# Patient Record
Sex: MALE | Race: WHITE | NOT HISPANIC OR LATINO | URBAN - METROPOLITAN AREA
[De-identification: names, ages, dates, MRNs, and addresses within clinical notes are randomized per-mention and may not be internally consistent; named-entity substitution may affect disease eponyms.]

---

## 2020-07-25 ENCOUNTER — TELEPHONE ENCOUNTER (OUTPATIENT)
Dept: URBAN - METROPOLITAN AREA CLINIC 13 | Facility: CLINIC | Age: 85
End: 2020-07-25

## 2020-07-25 RX ORDER — EZETIMIBE 10 MG/1
TABLET ORAL
Refills: 0 | OUTPATIENT
Start: 2010-06-28 | End: 2010-07-19

## 2020-07-25 RX ORDER — MULTIVIT-MIN/FA/LYCOPEN/LUTEIN .4-300-25
TAKE 1 TABLET DAILY TABLET ORAL
Refills: 0 | OUTPATIENT
End: 2019-01-18

## 2020-07-25 RX ORDER — METFORMIN HYDROCHLORIDE 500 MG/1
TAKE 1 TABLET DAILY AS DIRECTED TABLET, COATED ORAL
Refills: 0 | OUTPATIENT
End: 2018-02-02

## 2020-07-25 RX ORDER — BIFIDOBACTERIUM LONGUM 10MM CELL
TAKE 1 CAPSULE DAILY CAPSULE ORAL
Refills: 0 | OUTPATIENT
End: 2019-01-18

## 2020-07-25 RX ORDER — ALBUTEROL SULFATE 90 UG/1
INHALE 1 TO 2 PUFFS EVERY 4 TO 6 HOURS AS NEEDED AEROSOL, METERED RESPIRATORY (INHALATION)
Refills: 0 | OUTPATIENT
Start: 2019-01-18 | End: 2019-11-14

## 2020-07-25 RX ORDER — ATORVASTATIN CALCIUM 80 MG
TABLET ORAL
Refills: 0 | OUTPATIENT
Start: 2010-06-28 | End: 2010-07-19

## 2020-07-25 RX ORDER — METFORMIN HYDROCHLORIDE 750 MG/1
TAKE 2 TABLET DAILY TABLET, EXTENDED RELEASE ORAL
Refills: 0 | OUTPATIENT
End: 2019-11-14

## 2020-07-25 RX ORDER — AMLODIPINE BESYLATE 2.5 MG/1
TAKE 1 TABLET DAILY AS DIRECTED TABLET ORAL
Refills: 0 | OUTPATIENT
End: 2019-11-14

## 2020-07-25 RX ORDER — VALSARTAN 160 MG/1
TAKE 1 TABLET DAILY TABLET ORAL
Refills: 0 | OUTPATIENT
End: 2019-11-14

## 2020-07-25 RX ORDER — DIPHENOXYLATE HYDROCHLORIDE AND ATROPINE SULFATE 2.5; .025 MG/1; MG/1
TAKE 1 TABLET DAILY TABLET ORAL
Qty: 90 | Refills: 1 | OUTPATIENT
Start: 2016-01-21 | End: 2019-04-10

## 2020-07-25 RX ORDER — EMPAGLIFLOZIN 10 MG/1
TAKE 1 TABLET BY MOUTH ONCE DAILY TABLET, FILM COATED ORAL
Refills: 0 | OUTPATIENT
End: 2019-11-14

## 2020-07-25 RX ORDER — WARFARIN 5 MG/1
TAKE 1 TABLET DAILY AS DIRECTED TABLET ORAL
Refills: 0 | OUTPATIENT
End: 2019-11-14

## 2020-07-25 RX ORDER — FLUTICASONE PROPIONATE 50 UG/1
USE 1 SPRAY IN EACH NOSTRIL ONCE DAILY SPRAY, METERED NASAL
Refills: 0 | OUTPATIENT
Start: 2019-01-14 | End: 2019-11-14

## 2020-07-26 ENCOUNTER — TELEPHONE ENCOUNTER (OUTPATIENT)
Dept: URBAN - METROPOLITAN AREA CLINIC 13 | Facility: CLINIC | Age: 85
End: 2020-07-26

## 2020-07-26 RX ORDER — METOPROLOL SUCCINATE 50 MG/1
TAKE 1 TABLET DAILY TABLET, EXTENDED RELEASE ORAL
Refills: 0 | Status: ACTIVE | COMMUNITY

## 2020-07-26 RX ORDER — SITAGLIPTIN PHOSPHATE 100 MG
TAKE 1 TABLET DAILY TABLET ORAL
Refills: 0 | Status: ACTIVE | COMMUNITY

## 2020-07-26 RX ORDER — LOSARTAN POTASSIUM 100 MG/1
TABLET, FILM COATED ORAL
Qty: 90 | Refills: 0 | Status: ACTIVE | COMMUNITY
Start: 2018-07-25

## 2020-07-26 RX ORDER — INDAPAMIDE 1.25 MG/1
TABLET, FILM COATED ORAL
Qty: 90 | Refills: 0 | Status: ACTIVE | COMMUNITY
Start: 2018-03-08

## 2020-07-26 RX ORDER — IPRATROPIUM BROMIDE 42 UG/1
SPRAY, METERED NASAL
Qty: 15 | Refills: 0 | Status: ACTIVE | COMMUNITY
Start: 2018-08-20

## 2020-07-26 RX ORDER — METFORMIN HYDROCHLORIDE 750 MG/1
TAKE 2 TABLETS ONCE DAILY WITH THE EVENING MEAL TABLET, EXTENDED RELEASE ORAL
Refills: 0 | Status: ACTIVE | COMMUNITY

## 2020-07-26 RX ORDER — EMPAGLIFLOZIN 25 MG/1
TABLET, FILM COATED ORAL
Refills: 0 | Status: ACTIVE | COMMUNITY

## 2020-07-26 RX ORDER — CLINDAMYCIN PHOSPHATE 10 MG/ML
LOTION TOPICAL
Qty: 60 | Refills: 0 | Status: ACTIVE | COMMUNITY
Start: 2019-02-14

## 2020-07-26 RX ORDER — CHOLECALCIFEROL (VITAMIN D3) 1250 MCG
TAKE CAPSULE  2000-5000 UNITS CAPSULE ORAL
Refills: 0 | Status: ACTIVE | COMMUNITY

## 2020-07-26 RX ORDER — FLUTICASONE PROPIONATE 50 UG/1
SPRAY, METERED NASAL
Qty: 16 | Refills: 0 | Status: ACTIVE | COMMUNITY
Start: 2019-01-14

## 2020-07-26 RX ORDER — DIPHENOXYLATE HCL/ATROPINE 2.5-.025MG
TAKE 1 TABLET TWICE DAILY AS NEEDED TABLET ORAL
Qty: 180 | Refills: 1 | Status: ACTIVE | COMMUNITY

## 2020-07-26 RX ORDER — VALACYCLOVIR 1 G/1
TABLET, FILM COATED ORAL
Qty: 7 | Refills: 0 | Status: ACTIVE | COMMUNITY
Start: 2018-01-23

## 2020-07-26 RX ORDER — WARFARIN 5 MG/1
TAKE 1 TABLET DAILY AS DIRECTED TABLET ORAL
Refills: 0 | Status: ACTIVE | COMMUNITY

## 2020-07-26 RX ORDER — HYDROCODONE BITARTRATE AND ACETAMINOPHEN 5; 325 MG/1; MG/1
TABLET ORAL
Qty: 21 | Refills: 0 | Status: ACTIVE | COMMUNITY
Start: 2019-06-03

## 2020-07-26 RX ORDER — VALSARTAN 160 MG/1
TAKE 1 TABLET DAILY TABLET ORAL
Refills: 0 | Status: ACTIVE | COMMUNITY

## 2020-07-26 RX ORDER — VIT A/VIT C/VIT E/ZINC/COPPER 2148-113
TAKE 1 TABLET DAILY TABLET ORAL
Refills: 0 | Status: ACTIVE | COMMUNITY

## 2020-07-26 RX ORDER — AMLODIPINE BESYLATE 2.5 MG/1
TABLET ORAL
Qty: 90 | Refills: 0 | Status: ACTIVE | COMMUNITY
Start: 2019-08-20

## 2020-07-26 RX ORDER — EZETIMIBE 10 MG/1
TAKE 1 TABLET DAILY TABLET ORAL
Refills: 0 | Status: ACTIVE | COMMUNITY

## 2020-07-26 RX ORDER — ALBUTEROL SULFATE 90 UG/1
AEROSOL, METERED RESPIRATORY (INHALATION)
Qty: 18 | Refills: 0 | Status: ACTIVE | COMMUNITY
Start: 2019-01-18

## 2020-07-26 RX ORDER — PENICILLIN V POTASSIUM 500 MG/1
TABLET, FILM COATED ORAL
Qty: 28 | Refills: 0 | Status: ACTIVE | COMMUNITY
Start: 2019-06-03

## 2020-07-26 RX ORDER — INDAPAMIDE 1.25 MG/1
TABLET, FILM COATED ORAL
Qty: 90 | Refills: 0 | Status: ACTIVE | COMMUNITY
Start: 2018-05-31

## 2020-10-15 ENCOUNTER — OFFICE VISIT (OUTPATIENT)
Dept: URBAN - METROPOLITAN AREA CLINIC 113 | Facility: CLINIC | Age: 85
End: 2020-10-15
Payer: MEDICARE

## 2020-10-15 ENCOUNTER — WEB ENCOUNTER (OUTPATIENT)
Dept: URBAN - METROPOLITAN AREA CLINIC 113 | Facility: CLINIC | Age: 85
End: 2020-10-15

## 2020-10-15 ENCOUNTER — LAB OUTSIDE AN ENCOUNTER (OUTPATIENT)
Dept: URBAN - METROPOLITAN AREA CLINIC 113 | Facility: CLINIC | Age: 85
End: 2020-10-15

## 2020-10-15 VITALS
RESPIRATION RATE: 22 BRPM | HEART RATE: 62 BPM | WEIGHT: 166 LBS | HEIGHT: 70 IN | SYSTOLIC BLOOD PRESSURE: 118 MMHG | BODY MASS INDEX: 23.77 KG/M2 | TEMPERATURE: 98.4 F | DIASTOLIC BLOOD PRESSURE: 67 MMHG

## 2020-10-15 DIAGNOSIS — Z86.010 HISTORY OF COLON POLYPS: ICD-10-CM

## 2020-10-15 PROBLEM — 428283002: Status: ACTIVE | Noted: 2020-10-15

## 2020-10-15 PROCEDURE — 99214 OFFICE O/P EST MOD 30 MIN: CPT | Performed by: INTERNAL MEDICINE

## 2020-10-15 RX ORDER — VIT A/VIT C/VIT E/ZINC/COPPER 2148-113
TAKE 1 TABLET DAILY TABLET ORAL
Refills: 0 | Status: ACTIVE | COMMUNITY

## 2020-10-15 RX ORDER — METOPROLOL SUCCINATE 50 MG/1
TAKE 1 TABLET DAILY TABLET, EXTENDED RELEASE ORAL
Refills: 0 | Status: ACTIVE | COMMUNITY

## 2020-10-15 RX ORDER — METFORMIN HYDROCHLORIDE 750 MG/1
1 TABLET WITH EVENING MEAL TABLET, EXTENDED RELEASE ORAL TWICE DAILY
Refills: 0 | Status: ACTIVE | COMMUNITY

## 2020-10-15 RX ORDER — WARFARIN 5 MG/1
TAKE 1 TABLET DAILY AS DIRECTED TABLET ORAL
Refills: 0 | Status: ACTIVE | COMMUNITY

## 2020-10-15 RX ORDER — EMPAGLIFLOZIN 25 MG/1
1 TABLET TABLET, FILM COATED ORAL ONCE A DAY
Refills: 0 | Status: ACTIVE | COMMUNITY

## 2020-10-15 RX ORDER — VALSARTAN 160 MG/1
TAKE 1 TABLET DAILY TABLET ORAL
Refills: 0 | Status: ACTIVE | COMMUNITY

## 2020-10-15 RX ORDER — SODIUM, POTASSIUM,MAG SULFATES 17.5-3.13G
AS DIRECTED SOLUTION, RECONSTITUTED, ORAL ORAL
OUTPATIENT
Start: 2020-10-15

## 2020-10-15 RX ORDER — CHOLECALCIFEROL (VITAMIN D3) 1250 MCG
TAKE CAPSULE  2000-5000 UNITS CAPSULE ORAL
Refills: 0 | Status: ACTIVE | COMMUNITY

## 2020-10-15 RX ORDER — AMLODIPINE BESYLATE 2.5 MG/1
1 TABLET TABLET ORAL ONCE A DAY
Refills: 0 | Status: ACTIVE | COMMUNITY
Start: 2019-08-20

## 2020-10-15 RX ORDER — EZETIMIBE 10 MG/1
TAKE 1 TABLET DAILY TABLET ORAL ONCE A DAY
Refills: 0 | Status: ACTIVE | COMMUNITY

## 2020-10-15 RX ORDER — DIPHENOXYLATE HCL/ATROPINE 2.5-.025MG
1 TABLET AS NEEDED TABLET ORAL AS NEEDED
Refills: 1 | Status: ACTIVE | COMMUNITY

## 2020-10-15 RX ORDER — SITAGLIPTIN PHOSPHATE 100 MG
TAKE 1 TABLET DAILY TABLET ORAL
Refills: 0 | Status: ACTIVE | COMMUNITY

## 2020-10-15 NOTE — EXAM-PHYSICAL EXAM
General--no acute distress Eyes--anicteric HENT--normocephalic, atraumatic head Neck--no lymphadenopathy Chest--normal breath sounds Heart--irregular rate and rhythm Abdomen--soft, non tender, non distended, bowel sounds present

## 2020-10-15 NOTE — HPI-TODAY'S VISIT:
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 He was last seen here in November 2019 for follow up of elevated LFTs.  He had been given a skin lotion during a manicure and pedicure which resulted in a skin rash, elevated liver enzymes and a leukocytosis. This was ultimately felt to be an allergic reaction to the lotion. He was also on Lipitor at that time. He was noted to have a weakly positive antinuclear antibody and a weakly positive F actin antibody. A right upper quadrant sonogram was performed on January 18, 2019. This revealed a small 14 x 9 x 9 mm septated cyst in the liver, but no gallstones and no bile duct dilatation. Repeat laboratory tests revealed normal liver enzymes, with an AST of 26, ALT of 29, alkaline phosphatase is 75, and a somewhat low albumin of 2.1.  Of note, he drinks alcohol socially. He does not drink to excess.  When he was last seen, he denied any abdominal pain, fever, chills, or sweats and his rash had resolved. He still had complaints of malaise and easy fatigability, and also noted a tremor and some issues with decreased memory. He denies any focal neurologic findings.  The patient returns today feeling well.  He last had a colonoscopy in November 2015 and he has a history of adenomatous colon polyps. He is here to discuss the need for repeat colonoscopy.

## 2020-10-16 ENCOUNTER — TELEPHONE ENCOUNTER (OUTPATIENT)
Dept: URBAN - METROPOLITAN AREA CLINIC 113 | Facility: CLINIC | Age: 85
End: 2020-10-16

## 2020-10-16 RX ORDER — DIPHENOXYLATE HCL/ATROPINE 2.5-.025MG
1 TABLET AS NEEDED TABLET ORAL AS NEEDED
Refills: 3

## 2020-10-16 RX ORDER — DIPHENOXYLATE HCL/ATROPINE 2.5-.025MG
1 TABLET AS NEEDED TABLET ORAL
Qty: 60 | Refills: 3

## 2020-10-16 RX ORDER — DIPHENOXYLATE HYDROCHLORIDE AND ATROPINE SULFATE 2.5; .025 MG/1; MG/1
1 TABLET AS NEEDED TABLET ORAL
OUTPATIENT
Start: 2020-10-16

## 2020-10-16 RX ORDER — SODIUM, POTASSIUM,MAG SULFATES 17.5-3.13G
354ML SOLUTION, RECONSTITUTED, ORAL ORAL
Qty: 354 MILLILITER | Refills: 0 | OUTPATIENT
Start: 2020-10-16 | End: 2020-10-17

## 2020-10-21 ENCOUNTER — OFFICE VISIT (OUTPATIENT)
Dept: URBAN - METROPOLITAN AREA SURGERY CENTER 25 | Facility: SURGERY CENTER | Age: 85
End: 2020-10-21
Payer: MEDICARE

## 2020-10-21 ENCOUNTER — CLAIMS CREATED FROM THE CLAIM WINDOW (OUTPATIENT)
Dept: URBAN - METROPOLITAN AREA CLINIC 4 | Facility: CLINIC | Age: 85
End: 2020-10-21
Payer: MEDICARE

## 2020-10-21 DIAGNOSIS — Z86.010 H/O ADENOMATOUS POLYP OF COLON: ICD-10-CM

## 2020-10-21 DIAGNOSIS — D12.3 ADENOMA OF TRANSVERSE COLON: ICD-10-CM

## 2020-10-21 DIAGNOSIS — D12.3 BENIGN NEOPLASM OF TRANSVERSE COLON: ICD-10-CM

## 2020-10-21 PROCEDURE — 45385 COLONOSCOPY W/LESION REMOVAL: CPT | Performed by: INTERNAL MEDICINE

## 2020-10-21 PROCEDURE — G8907 PT DOC NO EVENTS ON DISCHARG: HCPCS | Performed by: INTERNAL MEDICINE

## 2020-10-21 PROCEDURE — 88305 TISSUE EXAM BY PATHOLOGIST: CPT | Performed by: PATHOLOGY

## 2020-10-21 PROCEDURE — G9936 PMH PLYP/NEO CO/RECT/JUN/ANS: HCPCS | Performed by: INTERNAL MEDICINE

## 2021-10-19 ENCOUNTER — WEB ENCOUNTER (OUTPATIENT)
Dept: URBAN - METROPOLITAN AREA CLINIC 113 | Facility: CLINIC | Age: 86
End: 2021-10-19

## 2021-10-19 ENCOUNTER — OFFICE VISIT (OUTPATIENT)
Dept: URBAN - METROPOLITAN AREA CLINIC 113 | Facility: CLINIC | Age: 86
End: 2021-10-19
Payer: MEDICARE

## 2021-10-19 VITALS
DIASTOLIC BLOOD PRESSURE: 68 MMHG | WEIGHT: 166 LBS | HEART RATE: 72 BPM | SYSTOLIC BLOOD PRESSURE: 125 MMHG | TEMPERATURE: 97.7 F | HEIGHT: 70 IN | RESPIRATION RATE: 18 BRPM | BODY MASS INDEX: 23.77 KG/M2

## 2021-10-19 DIAGNOSIS — K57.90 DIVERTICULOSIS: ICD-10-CM

## 2021-10-19 DIAGNOSIS — D12.3 BENIGN NEOPLASM OF TRANSVERSE COLON: ICD-10-CM

## 2021-10-19 PROCEDURE — 99213 OFFICE O/P EST LOW 20 MIN: CPT | Performed by: INTERNAL MEDICINE

## 2021-10-19 RX ORDER — CHOLECALCIFEROL (VITAMIN D3) 1250 MCG
TAKE CAPSULE  2000-5000 UNITS CAPSULE ORAL
Refills: 0 | Status: ACTIVE | COMMUNITY

## 2021-10-19 RX ORDER — WARFARIN 5 MG/1
TAKE 1 TABLET DAILY AS DIRECTED TABLET ORAL
Refills: 0 | Status: ACTIVE | COMMUNITY

## 2021-10-19 RX ORDER — EMPAGLIFLOZIN 25 MG/1
1 TABLET TABLET, FILM COATED ORAL ONCE A DAY
Refills: 0 | Status: ACTIVE | COMMUNITY

## 2021-10-19 RX ORDER — DIPHENOXYLATE HCL/ATROPINE 2.5-.025MG
1 TABLET AS NEEDED TABLET ORAL
Qty: 60 | Refills: 3
End: 2022-02-16

## 2021-10-19 RX ORDER — DIPHENOXYLATE HCL/ATROPINE 2.5-.025MG
1 TABLET AS NEEDED TABLET ORAL
Qty: 60 | Refills: 3 | Status: ON HOLD | COMMUNITY

## 2021-10-19 RX ORDER — METFORMIN HYDROCHLORIDE 750 MG/1
1 TABLET WITH EVENING MEAL TABLET, EXTENDED RELEASE ORAL ONCE A DAY
Refills: 0 | Status: ACTIVE | COMMUNITY

## 2021-10-19 RX ORDER — METOPROLOL SUCCINATE 50 MG/1
TAKE 1 TABLET DAILY TABLET, EXTENDED RELEASE ORAL
Refills: 0 | Status: ACTIVE | COMMUNITY

## 2021-10-19 RX ORDER — AMLODIPINE BESYLATE 2.5 MG/1
1 TABLET TABLET ORAL ONCE A DAY
Refills: 0 | Status: ACTIVE | COMMUNITY
Start: 2019-08-20

## 2021-10-19 RX ORDER — SODIUM, POTASSIUM,MAG SULFATES 17.5-3.13G
AS DIRECTED SOLUTION, RECONSTITUTED, ORAL ORAL
Status: ON HOLD | COMMUNITY
Start: 2020-10-15

## 2021-10-19 RX ORDER — VALSARTAN 160 MG/1
TAKE 1 TABLET DAILY TABLET ORAL
Refills: 0 | Status: ACTIVE | COMMUNITY

## 2021-10-19 RX ORDER — SITAGLIPTIN PHOSPHATE 100 MG
TAKE 1 TABLET DAILY TABLET ORAL
Refills: 0 | Status: ACTIVE | COMMUNITY

## 2021-10-19 RX ORDER — EZETIMIBE 10 MG/1
TAKE 1 TABLET DAILY TABLET ORAL ONCE A DAY
Refills: 0 | Status: ACTIVE | COMMUNITY

## 2021-10-19 RX ORDER — DIPHENOXYLATE HYDROCHLORIDE AND ATROPINE SULFATE 2.5; .025 MG/1; MG/1
1 TABLET AS NEEDED TABLET ORAL
Status: ACTIVE | COMMUNITY
Start: 2020-10-16

## 2021-10-19 RX ORDER — VIT A/VIT C/VIT E/ZINC/COPPER 2148-113
TAKE 1 TABLETS DAILY TABLET ORAL TWICE DAILY
Refills: 0 | Status: ACTIVE | COMMUNITY

## 2021-10-19 NOTE — HPI-TODAY'S VISIT:
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 he was last seen here on 10/25/20 to discuss the possibility of repeat colonoscopic examination. He last had a colonoscopy in November 2015 and he had a history of adenomatous colon polyps.  He last underwent colonoscopy on 10/23/20.  This revealed diverticulosis and internal hemorrhoids as well as an 11 mm transverse colon tubular adenoma. Normally, this would mandate a 5 year follow up exam, but as he will be nearly 89 at that time, this follow up exam will be deferred unless he becomes symptomatic.   He was previously seen here in November 2019 for follow up of elevated LFTs.  He had been given a skin lotion during a manicure and pedicure which resulted in a skin rash, elevated liver enzymes and leukocytosis. This was ultimately felt to be an allergic reaction to the lotion. He was also on Lipitor at that time. He was noted to have a weakly positive antinuclear antibody and a weakly positive F actin antibody. A right upper quadrant sonogram was performed on January 18, 2019. This revealed a small 14 x 9 x 9 mm septated cyst in the liver, but no gallstones and no bile duct dilatation. Repeat laboratory tests consistently revealed normal liver enzymes since. Of note, he drinks alcohol socially. He does not drink to excess.  He has no GI complaints today except for rare bouts of diarrhea, for which he takes Lomotil. These occur less than once per month.

## 2021-10-24 PROBLEM — 397881000: Status: ACTIVE | Noted: 2021-10-24

## 2022-08-22 PROBLEM — 92448001 BENIGN NEOPLASM OF TRANSVERSE COLON: Status: ACTIVE | Noted: 2022-08-22

## 2022-09-16 ENCOUNTER — TELEPHONE ENCOUNTER (OUTPATIENT)
Dept: URBAN - METROPOLITAN AREA CLINIC 113 | Facility: CLINIC | Age: 87
End: 2022-09-16

## 2022-09-16 RX ORDER — APIXABAN 5 MG/1
1 TABLET TABLET, FILM COATED ORAL TWICE A DAY
Status: ACTIVE | COMMUNITY

## 2022-09-16 RX ORDER — EZETIMIBE 10 MG/1
TAKE 1 TABLET DAILY TABLET ORAL ONCE A DAY
Refills: 0 | COMMUNITY

## 2022-09-16 RX ORDER — METOPROLOL SUCCINATE 50 MG/1
TAKE 1 TABLET DAILY TABLET, EXTENDED RELEASE ORAL
Refills: 0 | COMMUNITY

## 2022-09-16 RX ORDER — VIT A/VIT C/VIT E/ZINC/COPPER 2148-113
TAKE 1 TABLETS DAILY TABLET ORAL TWICE DAILY
Refills: 0 | COMMUNITY

## 2022-09-16 RX ORDER — EMPAGLIFLOZIN 25 MG/1
1 TABLET TABLET, FILM COATED ORAL ONCE A DAY
Refills: 0 | COMMUNITY

## 2022-09-16 RX ORDER — SITAGLIPTIN PHOSPHATE 100 MG
TAKE 1 TABLET DAILY TABLET ORAL
Refills: 0 | COMMUNITY

## 2022-09-16 RX ORDER — CHOLECALCIFEROL (VITAMIN D3) 1250 MCG
TAKE CAPSULE  2000-5000 UNITS CAPSULE ORAL
Refills: 0 | COMMUNITY

## 2022-09-16 RX ORDER — SODIUM, POTASSIUM,MAG SULFATES 17.5-3.13G
AS DIRECTED SOLUTION, RECONSTITUTED, ORAL ORAL
COMMUNITY
Start: 2020-10-15

## 2022-09-16 RX ORDER — WARFARIN 5 MG/1
TAKE 1 TABLET DAILY AS DIRECTED TABLET ORAL
Refills: 0 | Status: DISCONTINUED | COMMUNITY

## 2022-09-16 RX ORDER — VALSARTAN 160 MG/1
TAKE 1 TABLET DAILY TABLET ORAL
Refills: 0 | COMMUNITY

## 2022-09-16 RX ORDER — METFORMIN HYDROCHLORIDE 750 MG/1
1 TABLET WITH EVENING MEAL TABLET, EXTENDED RELEASE ORAL ONCE A DAY
Refills: 0 | COMMUNITY

## 2022-09-16 RX ORDER — AMLODIPINE BESYLATE 2.5 MG/1
1 TABLET TABLET ORAL ONCE A DAY
Refills: 0 | COMMUNITY
Start: 2019-08-20

## 2022-09-16 RX ORDER — DIPHENOXYLATE HYDROCHLORIDE AND ATROPINE SULFATE 2.5; .025 MG/1; MG/1
1 TABLET AS NEEDED TABLET ORAL
COMMUNITY
Start: 2020-10-16

## 2022-09-22 ENCOUNTER — TELEPHONE ENCOUNTER (OUTPATIENT)
Dept: URBAN - METROPOLITAN AREA CLINIC 113 | Facility: CLINIC | Age: 87
End: 2022-09-22

## 2022-10-06 ENCOUNTER — CLAIMS CREATED FROM THE CLAIM WINDOW (OUTPATIENT)
Dept: URBAN - METROPOLITAN AREA CLINIC 4 | Facility: CLINIC | Age: 87
End: 2022-10-06
Payer: MEDICARE

## 2022-10-06 ENCOUNTER — OFFICE VISIT (OUTPATIENT)
Dept: URBAN - METROPOLITAN AREA SURGERY CENTER 25 | Facility: SURGERY CENTER | Age: 87
End: 2022-10-06
Payer: MEDICARE

## 2022-10-06 DIAGNOSIS — K63.89 OTHER SPECIFIED DISEASES OF INTESTINE: ICD-10-CM

## 2022-10-06 DIAGNOSIS — Z86.010 ADENOMAS PERSONAL HISTORY OF COLONIC POLYPS: ICD-10-CM

## 2022-10-06 PROCEDURE — G8907 PT DOC NO EVENTS ON DISCHARG: HCPCS | Performed by: INTERNAL MEDICINE

## 2022-10-06 PROCEDURE — 88305 TISSUE EXAM BY PATHOLOGIST: CPT | Performed by: PATHOLOGY

## 2022-10-06 PROCEDURE — 45385 COLONOSCOPY W/LESION REMOVAL: CPT | Performed by: INTERNAL MEDICINE

## 2022-10-06 RX ORDER — VIT A/VIT C/VIT E/ZINC/COPPER 2148-113
TAKE 1 TABLETS DAILY TABLET ORAL TWICE DAILY
Refills: 0 | COMMUNITY

## 2022-10-06 RX ORDER — APIXABAN 5 MG/1
1 TABLET TABLET, FILM COATED ORAL TWICE A DAY
Status: ACTIVE | COMMUNITY

## 2022-10-06 RX ORDER — METFORMIN HYDROCHLORIDE 750 MG/1
1 TABLET WITH EVENING MEAL TABLET, EXTENDED RELEASE ORAL ONCE A DAY
Refills: 0 | COMMUNITY

## 2022-10-06 RX ORDER — METOPROLOL SUCCINATE 50 MG/1
TAKE 1 TABLET DAILY TABLET, EXTENDED RELEASE ORAL
Refills: 0 | COMMUNITY

## 2022-10-06 RX ORDER — SODIUM, POTASSIUM,MAG SULFATES 17.5-3.13G
AS DIRECTED SOLUTION, RECONSTITUTED, ORAL ORAL
COMMUNITY
Start: 2020-10-15

## 2022-10-06 RX ORDER — DIPHENOXYLATE HYDROCHLORIDE AND ATROPINE SULFATE 2.5; .025 MG/1; MG/1
1 TABLET AS NEEDED TABLET ORAL
COMMUNITY
Start: 2020-10-16

## 2022-10-06 RX ORDER — EMPAGLIFLOZIN 25 MG/1
1 TABLET TABLET, FILM COATED ORAL ONCE A DAY
Refills: 0 | COMMUNITY

## 2022-10-06 RX ORDER — AMLODIPINE BESYLATE 2.5 MG/1
1 TABLET TABLET ORAL ONCE A DAY
Refills: 0 | COMMUNITY
Start: 2019-08-20

## 2022-10-06 RX ORDER — EZETIMIBE 10 MG/1
TAKE 1 TABLET DAILY TABLET ORAL ONCE A DAY
Refills: 0 | COMMUNITY

## 2022-10-06 RX ORDER — CHOLECALCIFEROL (VITAMIN D3) 1250 MCG
TAKE CAPSULE  2000-5000 UNITS CAPSULE ORAL
Refills: 0 | COMMUNITY

## 2022-10-06 RX ORDER — SITAGLIPTIN PHOSPHATE 100 MG
TAKE 1 TABLET DAILY TABLET ORAL
Refills: 0 | COMMUNITY

## 2022-10-06 RX ORDER — VALSARTAN 160 MG/1
TAKE 1 TABLET DAILY TABLET ORAL
Refills: 0 | COMMUNITY

## 2022-10-25 PROBLEM — 428283002 HISTORY OF POLYP OF COLON: Status: ACTIVE | Noted: 2022-10-14

## 2023-06-26 ENCOUNTER — WEB ENCOUNTER (OUTPATIENT)
Dept: URBAN - METROPOLITAN AREA CLINIC 113 | Facility: CLINIC | Age: 88
End: 2023-06-26

## 2023-06-26 ENCOUNTER — OFFICE VISIT (OUTPATIENT)
Dept: URBAN - METROPOLITAN AREA CLINIC 113 | Facility: CLINIC | Age: 88
End: 2023-06-26
Payer: MEDICARE

## 2023-06-26 ENCOUNTER — DASHBOARD ENCOUNTERS (OUTPATIENT)
Age: 88
End: 2023-06-26

## 2023-06-26 VITALS
RESPIRATION RATE: 18 BRPM | SYSTOLIC BLOOD PRESSURE: 149 MMHG | BODY MASS INDEX: 23.74 KG/M2 | TEMPERATURE: 97.3 F | WEIGHT: 165.8 LBS | HEART RATE: 57 BPM | HEIGHT: 70 IN | DIASTOLIC BLOOD PRESSURE: 70 MMHG

## 2023-06-26 DIAGNOSIS — Z86.010 HISTORY OF COLON POLYPS: ICD-10-CM

## 2023-06-26 DIAGNOSIS — K57.90 DIVERTICULOSIS: ICD-10-CM

## 2023-06-26 PROCEDURE — 99213 OFFICE O/P EST LOW 20 MIN: CPT | Performed by: INTERNAL MEDICINE

## 2023-06-26 RX ORDER — EMPAGLIFLOZIN 25 MG/1
1 TABLET TABLET, FILM COATED ORAL ONCE A DAY
Refills: 0 | Status: ACTIVE | COMMUNITY

## 2023-06-26 RX ORDER — VALSARTAN 160 MG/1
TAKE 1 TABLET DAILY TABLET ORAL
Refills: 0 | Status: ACTIVE | COMMUNITY

## 2023-06-26 RX ORDER — SITAGLIPTIN PHOSPHATE 100 MG
TAKE 1 TABLET DAILY TABLET ORAL
Refills: 0 | Status: ACTIVE | COMMUNITY

## 2023-06-26 RX ORDER — CHOLECALCIFEROL (VITAMIN D3) 1250 MCG
TAKE CAPSULE  2000-5000 UNITS CAPSULE ORAL
Refills: 0 | Status: ACTIVE | COMMUNITY

## 2023-06-26 RX ORDER — METFORMIN HYDROCHLORIDE 750 MG/1
1 TABLET WITH EVENING MEAL TABLET, EXTENDED RELEASE ORAL ONCE A DAY
Refills: 0 | Status: ACTIVE | COMMUNITY

## 2023-06-26 RX ORDER — METOPROLOL SUCCINATE 50 MG/1
TAKE 1 TABLET DAILY TABLET, EXTENDED RELEASE ORAL
Refills: 0 | Status: ACTIVE | COMMUNITY

## 2023-06-26 RX ORDER — VIT A/VIT C/VIT E/ZINC/COPPER 2148-113
TAKE 1 TABLETS DAILY TABLET ORAL TWICE DAILY
Refills: 0 | Status: ACTIVE | COMMUNITY

## 2023-06-26 RX ORDER — DIPHENOXYLATE HYDROCHLORIDE AND ATROPINE SULFATE 2.5; .025 MG/1; MG/1
1 TABLET AS NEEDED TABLET ORAL
COMMUNITY
Start: 2020-10-16

## 2023-06-26 RX ORDER — APIXABAN 5 MG/1
1 TABLET TABLET, FILM COATED ORAL TWICE A DAY
Status: ACTIVE | COMMUNITY

## 2023-06-26 RX ORDER — ATORVASTATIN CALCIUM 20 MG/1
1 TABLET TABLET, FILM COATED ORAL ONCE A DAY
Status: ACTIVE | COMMUNITY

## 2023-06-26 RX ORDER — AMLODIPINE BESYLATE 2.5 MG/1
1 TABLET TABLET ORAL ONCE A DAY
Refills: 0 | Status: ACTIVE | COMMUNITY
Start: 2019-08-20

## 2023-06-26 RX ORDER — EZETIMIBE 10 MG/1
TAKE 1 TABLET DAILY TABLET ORAL ONCE A DAY
Refills: 0 | Status: ACTIVE | COMMUNITY

## 2023-06-26 RX ORDER — SODIUM, POTASSIUM,MAG SULFATES 17.5-3.13G
AS DIRECTED SOLUTION, RECONSTITUTED, ORAL ORAL
COMMUNITY
Start: 2020-10-15

## 2023-06-26 NOTE — HPI-TODAY'S VISIT:
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  He was last seen here on 10/19/21.    He was previously seen here in November 2019 for follow up of elevated LFTs.  He had been given a skin lotion during a manicure and pedicure which resulted in a skin rash, elevated liver enzymes and leukocytosis. This was ultimately felt to be an allergic reaction to the lotion. He was also on Lipitor at that time. He was noted to have a weakly positive antinuclear antibody and a weakly positive F actin antibody. A right upper quadrant sonogram was performed on January 18, 2019. This revealed a small 14 x 9 x 9 mm septated cyst in the liver, but no gallstones and no bile duct dilatation. Repeat laboratory tests consistently revealed normal liver enzymes since. Of note, he drinks alcohol socially. He does not drink to excess.  He was seen here on 10/25/20 to discuss the possibility of repeat colonoscopic examination. He previously had a colonoscopy in November 2015 and he had a history of adenomatous colon polyps.  He subsequently underwent colonoscopy on 10/23/20.  This revealed diverticulosis and internal hemorrhoids as well as an 11 mm transverse colon tubular adenoma.   The patient had complaints of rare bouts of diarrhea at the time of his last visit, and elected to undergo repeat colonoscopy on October 6, 2022.  This revealed left colon diverticulosis, a 6 mm descending colon polyp which was a benign mucosal polyp, and grade 1 internal hemorrhoids.  He has no GI complaints today except for occasional fecal urgency upon arising in the morning.  He has bowel movements typically twice daily, without bleeding, and has no other complaints at this time.